# Patient Record
Sex: MALE | Race: OTHER | NOT HISPANIC OR LATINO
[De-identification: names, ages, dates, MRNs, and addresses within clinical notes are randomized per-mention and may not be internally consistent; named-entity substitution may affect disease eponyms.]

---

## 2023-04-17 PROBLEM — Z00.00 ENCOUNTER FOR PREVENTIVE HEALTH EXAMINATION: Status: ACTIVE | Noted: 2023-04-17

## 2023-04-21 ENCOUNTER — APPOINTMENT (OUTPATIENT)
Dept: CARDIOLOGY | Facility: CLINIC | Age: 38
End: 2023-04-21
Payer: COMMERCIAL

## 2023-04-21 VITALS
HEART RATE: 63 BPM | WEIGHT: 197 LBS | SYSTOLIC BLOOD PRESSURE: 134 MMHG | DIASTOLIC BLOOD PRESSURE: 79 MMHG | OXYGEN SATURATION: 98 % | TEMPERATURE: 99 F | RESPIRATION RATE: 18 BRPM

## 2023-04-21 DIAGNOSIS — R00.1 BRADYCARDIA, UNSPECIFIED: ICD-10-CM

## 2023-04-21 PROCEDURE — 99204 OFFICE O/P NEW MOD 45 MIN: CPT

## 2023-05-03 ENCOUNTER — APPOINTMENT (OUTPATIENT)
Dept: CARDIOLOGY | Facility: CLINIC | Age: 38
End: 2023-05-03
Payer: COMMERCIAL

## 2023-05-03 VITALS — DIASTOLIC BLOOD PRESSURE: 74 MMHG | SYSTOLIC BLOOD PRESSURE: 119 MMHG | TEMPERATURE: 98.6 F | HEIGHT: 67 IN

## 2023-05-03 DIAGNOSIS — R06.00 DYSPNEA, UNSPECIFIED: ICD-10-CM

## 2023-05-03 PROCEDURE — 93306 TTE W/DOPPLER COMPLETE: CPT

## 2023-05-03 PROCEDURE — 93015 CV STRESS TEST SUPVJ I&R: CPT

## 2023-05-05 PROBLEM — R06.00 DYSPNEA, UNSPECIFIED: Status: ACTIVE | Noted: 2023-04-21

## 2023-05-05 NOTE — ASSESSMENT
[FreeTextEntry1] : 37 year old M with no significant PMH who presents for evaluation of bradycardia.\par \par ECG from PCP 3/5/23 showed sinus bradycardia at 43 bpm. Labs reviewed from 3/2023, electrolytes and TSH normal.\par \par Pt works as an  and typically has no limitation in his work. He reports occasionally feeling short of breath for few seconds to the point he has to take deep breaths. This is not worsened by exertion. He does not feel palpitations or dizziness or LOC. He denies CP, orthopnea, PND, LE edema. He denies any history of HTN, HLD, or DM.\par \par 1) Marked sinus bradycardia\par 2) SOB\par - Given marked sinus bradycardia and reports of SOB, I advised pt to undergo TTE to assess LV function; 5/3/23 showed normal LVEF 65-70%, normal RV systolic function, and no significant valvular pathology\par - Given sinus bradycardia, I advised treadmill stress to assess chronotropic competence; 5/3/23, pt underwent 9:30 min of Polo protocol, achieving target HR and had no ischemic ECG changes\par - f/u 24 hour Holter result\par - Pt was reassured that his symptoms are unlikely cardiac-related\par \par 3) Follow-up, as needed

## 2023-05-05 NOTE — HISTORY OF PRESENT ILLNESS
[FreeTextEntry1] : 37 year old M with no significant PMH who presents for evaluation of bradycardia.\par \par ECG from PCP 3/5/23 showed sinus bradycardia at 43 bpm. Labs reviewed from 3/2023, electrolytes and TSH normal.\par \par Pt works as an  and typically has no limitations in his work. He reports occasionally feeling short of breath for few seconds to the point he has to take deep breaths. This can happen at work and at home, without clear triggers. He doesn't feel like it is worsened by exertion. He does not feel palpitations or dizziness or LOC. He denies CP, orthopnea, PND, LE edema. He denies any history of HTN, HLD, or DM.

## 2024-09-05 ENCOUNTER — NON-APPOINTMENT (OUTPATIENT)
Age: 39
End: 2024-09-05

## 2024-09-06 ENCOUNTER — APPOINTMENT (OUTPATIENT)
Dept: CARDIOLOGY | Facility: CLINIC | Age: 39
End: 2024-09-06
Payer: COMMERCIAL

## 2024-09-06 ENCOUNTER — NON-APPOINTMENT (OUTPATIENT)
Age: 39
End: 2024-09-06

## 2024-09-06 VITALS
WEIGHT: 198 LBS | DIASTOLIC BLOOD PRESSURE: 84 MMHG | HEART RATE: 70 BPM | OXYGEN SATURATION: 96 % | RESPIRATION RATE: 18 BRPM | SYSTOLIC BLOOD PRESSURE: 135 MMHG | BODY MASS INDEX: 31.01 KG/M2

## 2024-09-06 DIAGNOSIS — R00.1 BRADYCARDIA, UNSPECIFIED: ICD-10-CM

## 2024-09-06 DIAGNOSIS — R42 DIZZINESS AND GIDDINESS: ICD-10-CM

## 2024-09-06 PROCEDURE — 99214 OFFICE O/P EST MOD 30 MIN: CPT | Mod: 25

## 2024-09-06 PROCEDURE — G2211 COMPLEX E/M VISIT ADD ON: CPT | Mod: NC

## 2024-09-06 PROCEDURE — 93000 ELECTROCARDIOGRAM COMPLETE: CPT
